# Patient Record
Sex: FEMALE | Race: OTHER | NOT HISPANIC OR LATINO | URBAN - METROPOLITAN AREA
[De-identification: names, ages, dates, MRNs, and addresses within clinical notes are randomized per-mention and may not be internally consistent; named-entity substitution may affect disease eponyms.]

---

## 2024-03-17 ENCOUNTER — EMERGENCY (EMERGENCY)
Facility: HOSPITAL | Age: 29
LOS: 1 days | Discharge: HOME | End: 2024-03-17
Attending: EMERGENCY MEDICINE
Payer: COMMERCIAL

## 2024-03-17 VITALS
DIASTOLIC BLOOD PRESSURE: 71 MMHG | HEIGHT: 65 IN | TEMPERATURE: 98 F | RESPIRATION RATE: 18 BRPM | SYSTOLIC BLOOD PRESSURE: 108 MMHG | OXYGEN SATURATION: 97 % | WEIGHT: 136.91 LBS | HEART RATE: 97 BPM

## 2024-03-17 PROCEDURE — 99282 EMERGENCY DEPT VISIT SF MDM: CPT

## 2024-03-17 PROCEDURE — 99284 EMERGENCY DEPT VISIT MOD MDM: CPT

## 2024-03-17 RX ORDER — DIPHENHYDRAMINE HCL 50 MG
50 CAPSULE ORAL ONCE
Refills: 0 | Status: DISCONTINUED | OUTPATIENT
Start: 2024-03-17 | End: 2024-03-20

## 2024-03-17 RX ORDER — DEXAMETHASONE 0.5 MG/5ML
10 ELIXIR ORAL ONCE
Refills: 0 | Status: DISCONTINUED | OUTPATIENT
Start: 2024-03-17 | End: 2024-03-20

## 2024-03-17 NOTE — ED PROVIDER NOTE - ENMT, MLM
Airway patent, Nasal mucosa clear. Mouth with normal mucosa. Throat has no vesicles, no oropharyngeal exudates and uvula is midline. No swelling, maintaining oral secretions, no stridor

## 2024-03-17 NOTE — ED PROVIDER NOTE - PATIENT PORTAL LINK FT
You can access the FollowMyHealth Patient Portal offered by [facility] by registering at the following website: [email or web address]. By joining [employer]’s FollowMyHealth portal, you will also be able to view your health information using other applications (apps) compatible with our system.

## 2024-03-17 NOTE — ED PROVIDER NOTE - OBJECTIVE STATEMENT
28-year-old female with history of possible seafood allergy presents to the ED complaining of diffuse itchy skin around 3 AM while sleeping.  Patient had shrimp ,calamari ,musselsat 11pm.  1 year ago had shrimp and had mild itchy skin.  No new pets, food, detergent, creams, medication.  Denies of any rash, throat swelling, trouble breathing, diarrhea, nausea or vomiting.

## 2024-03-17 NOTE — ED PROVIDER NOTE - CLINICAL SUMMARY MEDICAL DECISION MAKING FREE TEXT BOX
28-year-old female with history of possible seafood allergy presents to the ED complaining of diffuse itchy skin around 3 AM while sleeping.  Patient had shrimp ,calamari ,mussels at 11pm.  1 year ago had shrimp and had mild itchy skin.  No new pets, food, detergent, creams, medication.  Denies of any rash, throat swelling, trouble breathing, diarrhea, nausea or vomiting.    Possible allergic reaction however no other presenting symptoms.  Will give Benadryl and Decadron.  Advised to follow-up with allergy testing.  For now avoid any seafood

## 2024-03-17 NOTE — ED PROVIDER NOTE - NSFOLLOWUPINSTRUCTIONS_ED_ALL_ED_FT
Possible allergic reaction however no other presenting symptoms.  Will give Benadryl and Decadron.  Advised to follow-up with allergy testing.  For now avoid any seafood

## 2024-03-17 NOTE — ED ADULT TRIAGE NOTE - CHIEF COMPLAINT QUOTE
as per pt with allergic reaction,ate shrimp ,calamari ,musselsat 11pm went to sleep and woke up started itching,no rash no shortness of breath